# Patient Record
Sex: MALE | Race: WHITE
[De-identification: names, ages, dates, MRNs, and addresses within clinical notes are randomized per-mention and may not be internally consistent; named-entity substitution may affect disease eponyms.]

---

## 2018-04-24 ENCOUNTER — HOSPITAL ENCOUNTER (EMERGENCY)
Dept: HOSPITAL 17 - PHED | Age: 78
Discharge: HOME | End: 2018-04-24
Payer: MEDICARE

## 2018-04-24 VITALS — HEIGHT: 69 IN | WEIGHT: 249.12 LBS | BODY MASS INDEX: 36.9 KG/M2

## 2018-04-24 VITALS
SYSTOLIC BLOOD PRESSURE: 110 MMHG | RESPIRATION RATE: 18 BRPM | TEMPERATURE: 98.4 F | HEART RATE: 93 BPM | DIASTOLIC BLOOD PRESSURE: 68 MMHG | OXYGEN SATURATION: 97 %

## 2018-04-24 DIAGNOSIS — R53.1: Primary | ICD-10-CM

## 2018-04-24 DIAGNOSIS — I11.0: ICD-10-CM

## 2018-04-24 DIAGNOSIS — I50.9: ICD-10-CM

## 2018-04-24 DIAGNOSIS — E78.00: ICD-10-CM

## 2018-04-24 PROCEDURE — 99281 EMR DPT VST MAYX REQ PHY/QHP: CPT

## 2018-04-24 NOTE — PD
HPI


Chief Complaint:  Numbness/Tingling


Time Seen by Provider:  14:58


Travel History


International Travel<30 days:  No


Contact w/Intl Traveler<30days:  No


Traveled to known affect area:  No





History of Present Illness


HPI


78-year-old male complains of a less than 1 minute episode of bilateral lower 

extremity weakness.  The patient ambulated into an elevator and suddenly felt 

his legs become weak and then he fell to the ground in a somewhat slumping-like 

fashion causing some contact with the right shoulder and some subsequent pain.  

He denies head trauma.  No loss of consciousness.  No syncope/near syncope 

symptoms preceded the event.  He describes it as a somewhat abrupt brief (less 

than 1 minute) loss of motor function in the legs associated with complete 

sensory loss of the legs.  There is no upper extremity weakness numbness or 

facial neurologic change.  Patient reports similar episodes occurred several 

months ago following knee surgery however none have recurred since.  He has not 

been evaluated for them.  He notes taking Lyrica and drinking of beer in the 

few hours prior to the event.  He has no complaints in the ER.  Immediate and 

total neurologic recovery occurred after the long weakness interval.





PFSH


Past Medical History


High Cholesterol:  Yes


Congestive Heart Failure:  Yes


Diminished Hearing:  No


GERD:  Yes


Hypertension:  Yes


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


Influenza Vaccination:  Yes


Pregnant?:  Not Pregnant





Past Surgical History


Joint Replacement:  Yes (Left total knee)





Social History


Alcohol Use:  Yes (occ)


Tobacco Use:  No


Substance Use:  No





Allergies-Medications


(Allergen,Severity, Reaction):  


Coded Allergies:  


     Penicillins (Verified  Allergy, Intermediate, Hives, 4/24/18)


     cephalexin (Verified  Allergy, Intermediate, Rash, 4/24/18)


Reported Meds & Prescriptions





Reported Meds & Active Scripts


Active


Reported


Spironolactone 50 Mg Tab 50 Mg PO DAILY


Ramipril 10 Mg Cap 10 Mg PO DAILY


Mirapex (Pramipexole Dihydrochloride) 1 Mg Tab 2 Mg PO HS


Levothyroxine (Levothyroxine Sodium) 100 Mcg Tab 100 Mcg PO DAILY


Gabapentin 300 Mg Cap 300 Mg PO TID


Gabapentin 300 Mg Cap 300 Mg PO AC BREAKFAST


Furosemide 80 Mg Tab 80 Mg PO BID


Famotidine 20 Mg Tab 20 Mg PO BID


Duloxetine DR (Duloxetine HCl) 60 Mg Capdr 60 Mg PO DAILY


Doxycycline Hyclate 100 Mg Cap 100 Mg PO BID


Diphenhydramine (Diphenhydramine HCl) 25 Mg Cap 25 Mg PO Q12H PRN


Atorvastatin (Atorvastatin Calcium) 10 Mg Tab 10 Mg PO HS








Review of Systems


Except as stated in HPI:  all other systems reviewed are Neg


General / Constitutional:  No: Fever





Physical Exam


Narrative


GENERAL: 77 yo M, WNWD, NAD, AOX4





Vital Signs








  Date Time  Temp Pulse Resp B/P (MAP) Pulse Ox O2 Delivery O2 Flow Rate FiO2


 


4/24/18 14:39 98.4 93 18 110/68 (82) 97   








SKIN: Warm and dry.


HEAD: Atraumatic. Normocephalic. 


EYES: Pupils equal and round. No scleral icterus. No injection or drainage. 


ENT: No nasal bleeding or discharge.  Mucous membranes pink and moist.


NECK: Trachea midline. No JVD. 


CARDIOVASCULAR: Regular rate and rhythm.  


RESPIRATORY: No accessory muscle use. Clear to auscultation. Breath sounds 

equal bilaterally. 


GASTROINTESTINAL: Abdomen soft, non-tender, nondistended. Hepatic and splenic 

margins not palpable. 


MUSCULOSKELETAL: Extremities without clubbing, cyanosis, or edema. No obvious 

deformities. 


NEUROLOGICAL: AOx4. Motor function normal bilateral lower extremities. 

Sensation intact to light touch throughout the lower legs and feet. CNIII-XII 

normal. 


PSYCHIATRIC: Appropriate mood and affect; insight and judgment normal.





Data


Data


Last Documented VS





Vital Signs








  Date Time  Temp Pulse Resp B/P (MAP) Pulse Ox O2 Delivery O2 Flow Rate FiO2


 


4/24/18 14:39 98.4 93 18 110/68 (82) 97   








Orders





 Orders


Ed Discharge Order (4/24/18 15:11)








MDM


Medical Decision Making


Medical Screen Exam Complete:  Yes


Emergency Medical Condition:  Yes


Medical Record Reviewed:  Yes


Differential Diagnosis


neuropathy, syncope, stroke


Narrative Course


Presentation is not consistent with a stroke or TIA.  The patient states he 

will follow-up with his primary care provider in 2 days.  He also reports a 

full battery of blood work was noticed a few days ago.  Return precautions 

discussed.





Diagnosis





 Primary Impression:  


 Transient weakness of left lower extremity


 Additional Impression:  


 Transient weakness of right lower extremity


Referrals:  


Primary Care Physician


call for appointment


***Med/Other Pt SpecificInfo:  No Change to Meds


Disposition:  01 DISCHARGE HOME


Condition:  Stable











Marko Bhat MD Apr 24, 2018 15:12